# Patient Record
Sex: MALE | Race: WHITE
[De-identification: names, ages, dates, MRNs, and addresses within clinical notes are randomized per-mention and may not be internally consistent; named-entity substitution may affect disease eponyms.]

---

## 2018-01-29 ENCOUNTER — HOSPITAL ENCOUNTER (OUTPATIENT)
Dept: HOSPITAL 84 - OBSVTIME | Age: 73
LOS: 1 days | Discharge: HOME | End: 2018-01-30
Attending: INTERNAL MEDICINE
Payer: MEDICARE

## 2018-01-29 VITALS
WEIGHT: 158.13 LBS | HEIGHT: 70 IN | BODY MASS INDEX: 22.64 KG/M2 | WEIGHT: 158.13 LBS | BODY MASS INDEX: 22.64 KG/M2 | BODY MASS INDEX: 22.64 KG/M2 | HEIGHT: 70 IN

## 2018-01-29 VITALS — SYSTOLIC BLOOD PRESSURE: 136 MMHG | DIASTOLIC BLOOD PRESSURE: 79 MMHG

## 2018-01-29 VITALS — SYSTOLIC BLOOD PRESSURE: 152 MMHG | DIASTOLIC BLOOD PRESSURE: 85 MMHG

## 2018-01-29 DIAGNOSIS — I10: ICD-10-CM

## 2018-01-29 DIAGNOSIS — I25.119: Primary | ICD-10-CM

## 2018-01-29 DIAGNOSIS — T82.855A: ICD-10-CM
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
## 2018-01-29 DIAGNOSIS — E78.5: ICD-10-CM

## 2018-01-29 LAB
ANION GAP SERPL CALC-SCNC: 11.4 MMOL/L (ref 8–16)
BASOPHILS NFR BLD AUTO: 1.6 % (ref 0–2)
BUN SERPL-MCNC: 16 MG/DL (ref 7–18)
CALCIUM SERPL-MCNC: 8.8 MG/DL (ref 8.5–10.1)
CHLORIDE SERPL-SCNC: 104 MMOL/L (ref 98–107)
CO2 SERPL-SCNC: 28.8 MMOL/L (ref 21–32)
CREAT SERPL-MCNC: 1.1 MG/DL (ref 0.6–1.3)
EOSINOPHIL NFR BLD: 8.4 % (ref 0–7)
ERYTHROCYTE [DISTWIDTH] IN BLOOD BY AUTOMATED COUNT: 13.1 % (ref 11.5–14.5)
GLUCOSE SERPL-MCNC: 113 MG/DL (ref 74–106)
HCT VFR BLD CALC: 42 % (ref 42–54)
HGB BLD-MCNC: 13.8 G/DL (ref 13.5–17.5)
IMM GRANULOCYTES NFR BLD: 0.3 % (ref 0–5)
LYMPHOCYTES NFR BLD AUTO: 24.6 % (ref 15–50)
MCH RBC QN AUTO: 29 PG (ref 26–34)
MCHC RBC AUTO-ENTMCNC: 32.9 G/DL (ref 31–37)
MCV RBC: 88.2 FL (ref 80–100)
MONOCYTES NFR BLD: 12.4 % (ref 2–11)
NEUTROPHILS NFR BLD AUTO: 52.7 % (ref 40–80)
OSMOLALITY SERPL CALC.SUM OF ELEC: 280 MOSM/KG (ref 275–300)
PLATELET # BLD: 288 10X3/UL (ref 130–400)
PMV BLD AUTO: 10.1 FL (ref 7.4–10.4)
POTASSIUM SERPL-SCNC: 4.2 MMOL/L (ref 3.5–5.1)
RBC # BLD AUTO: 4.76 10X6/UL (ref 4.2–6.1)
SODIUM SERPL-SCNC: 140 MMOL/L (ref 136–145)
WBC # BLD AUTO: 7.4 10X3/UL (ref 4.8–10.8)

## 2018-01-29 PROCEDURE — 93458 L HRT ARTERY/VENTRICLE ANGIO: CPT

## 2018-01-30 VITALS — DIASTOLIC BLOOD PRESSURE: 79 MMHG | SYSTOLIC BLOOD PRESSURE: 128 MMHG

## 2019-12-27 ENCOUNTER — HOSPITAL ENCOUNTER (OUTPATIENT)
Dept: HOSPITAL 84 - D.HCCARDIO | Age: 74
Discharge: HOME | End: 2019-12-27
Attending: INTERNAL MEDICINE
Payer: MEDICARE

## 2019-12-27 VITALS — BODY MASS INDEX: 22.1 KG/M2

## 2019-12-27 DIAGNOSIS — I25.10: Primary | ICD-10-CM

## 2020-01-03 ENCOUNTER — HOSPITAL ENCOUNTER (OUTPATIENT)
Dept: HOSPITAL 84 - D.CATH | Age: 75
Discharge: HOME | End: 2020-01-03
Attending: INTERNAL MEDICINE
Payer: MEDICARE

## 2020-01-03 VITALS — BODY MASS INDEX: 22.95 KG/M2 | BODY MASS INDEX: 22.95 KG/M2 | WEIGHT: 160.34 LBS | HEIGHT: 70 IN

## 2020-01-03 VITALS — DIASTOLIC BLOOD PRESSURE: 86 MMHG | SYSTOLIC BLOOD PRESSURE: 160 MMHG

## 2020-01-03 DIAGNOSIS — I10: ICD-10-CM

## 2020-01-03 DIAGNOSIS — I25.110: Primary | ICD-10-CM

## 2020-01-03 DIAGNOSIS — E78.5: ICD-10-CM

## 2020-01-03 DIAGNOSIS — R06.09: ICD-10-CM

## 2020-01-03 LAB
ANION GAP SERPL CALC-SCNC: 11.8 MMOL/L (ref 8–16)
BASOPHILS NFR BLD AUTO: 1.8 % (ref 0–2)
BUN SERPL-MCNC: 17 MG/DL (ref 7–18)
CALCIUM SERPL-MCNC: 9 MG/DL (ref 8.5–10.1)
CHLORIDE SERPL-SCNC: 100 MMOL/L (ref 98–107)
CO2 SERPL-SCNC: 28.3 MMOL/L (ref 21–32)
CREAT SERPL-MCNC: 1.3 MG/DL (ref 0.6–1.3)
EOSINOPHIL NFR BLD: 7.6 % (ref 0–7)
ERYTHROCYTE [DISTWIDTH] IN BLOOD BY AUTOMATED COUNT: 14.2 % (ref 11.5–14.5)
GLUCOSE SERPL-MCNC: 129 MG/DL (ref 74–106)
HCT VFR BLD CALC: 38.8 % (ref 42–54)
HGB BLD-MCNC: 12.4 G/DL (ref 13.5–17.5)
IMM GRANULOCYTES NFR BLD: 0.4 % (ref 0–5)
LYMPHOCYTES NFR BLD AUTO: 24.1 % (ref 15–50)
MCH RBC QN AUTO: 26.7 PG (ref 26–34)
MCHC RBC AUTO-ENTMCNC: 32 G/DL (ref 31–37)
MCV RBC: 83.6 FL (ref 80–100)
MONOCYTES NFR BLD: 14.3 % (ref 2–11)
NEUTROPHILS NFR BLD AUTO: 51.8 % (ref 40–80)
OSMOLALITY SERPL CALC.SUM OF ELEC: 275 MOSM/KG (ref 275–300)
PLATELET # BLD: 340 10X3/UL (ref 130–400)
PMV BLD AUTO: 9.7 FL (ref 7.4–10.4)
POTASSIUM SERPL-SCNC: 4.1 MMOL/L (ref 3.5–5.1)
RBC # BLD AUTO: 4.64 10X6/UL (ref 4.2–6.1)
SODIUM SERPL-SCNC: 136 MMOL/L (ref 136–145)
WBC # BLD AUTO: 7.2 10X3/UL (ref 4.8–10.8)

## 2020-01-03 PROCEDURE — 93458 L HRT ARTERY/VENTRICLE ANGIO: CPT

## 2020-01-03 PROCEDURE — 93571 IV DOP VEL&/PRESS C FLO 1ST: CPT

## 2020-01-03 NOTE — NUR
4 CC AIR REMOVED FROM Z BAND, NO BLEEDING NOTED.  NO S/S HEMATOMA
NOTED. CAP REFILL BRISK.  PT TOLERATED LUNCH TRAY W/O NAUSEA.  DENIES
NEEDS AT THIS TIME.  VSS. CALL LIGHT IN REACH

## 2020-01-03 NOTE — NUR
PT AMBULATED TO BR, VOIDING W/O DIFFICULITY.  ZBAND AND REMAINING AIR
REMOVED W/O BLEEDING OR S/S HEMATOMA.  2X2 AND TEGADERM DRESSING
APPLIED.  IMMOBILIZER RE APPLIED TO R ARM.
1400 PT DISCHARGED TO WIFE WAITING IN PRIVATE VEHICLE.  PT HAD ALL
BELONGINGS AND DISCHARGE PAPERWORK.

## 2020-01-03 NOTE — HEMODYNAMI
PATIENT:SHERLYN BROWN                                  MEDICAL RECORD: [id number]
: 45                                            LOCATION:[facility]
ACCT# [id number]                                       ADMISSION DATE: 20
 
 
 Generatedon:1/3/62756:47
Patient name: SHERLYN BROWN Patient #: [id number] Visit #: [id number] SSN: [id number]
- :
1945 Date of study: 1/3/2020
Page: Of
Hemodynamic Procedure Report
****************************
Patient Data
Patient Demographics
Procedure consent was obtained
First Name: SHERLYN           Gender: Male
Last Name: STEPHANIE          : 1945
Middle Initial: B           Age: 75 year(s)
Patient #: [id number]       Race: 
Visit #: [id number]
SSN: 
Accession #:
[id number]
Additional ID: [id number]
Contact details
Address: [address] [place] [address]  Phone: [phone]
State: [place]
City: [place]
Zip code: [address]
Past Medical History
Performed procedures and imaging results
Date     Procedure           Procedure Results  Comments
Stress testing with Positive->High
SPECT MPI           risk
History of disease
Date         Diagnosis          Comments
CAD
Allergies
Allergen        Reaction        Date         Comments
Reported
Other allergy                   1/3/2020     SIMVASTATIN
Admission
Admission Data
Admission Date: 1/3/2020    Admission Time: 7:02
Arrival Date: 1/3/2020      Arrival Time: 0:00
[facility]
Height (in.): 70            BSA: 1.9 (m2)
Height (cm.): 177.8         BMI: 23.09 (kg/m2)
Weight (lbs.): 160.94
Weight (kg.): 73
Lab Results
Lab Result Date: 1/3/2020   Lab Result Time: 0:00
Biochemistry
Name         Units    Result                Min      Max
BUN          mg/dl    17       --(---*)--   7        18
Creatinine   mg/dl    1.3      --(---*)--   0.6      1.3
eGFR         ml/min   57       *-(----)--   90       120
NONAFRICAN
CBC
 
Name         Units    Result                Min      Max
Hematocrit   %        38.8     *-(----)--   42       54
Hemoglobin   g/dl     12.4     *-(----)--   13.5     17.5
Procedure
Procedure Types
Cath Procedure
Diagnostic Procedure
[facility] w/Coronaries
FFR/IVUS
FFR Initial
Sedation Charges
Moderate Sedation up to 30 minutes
PCI Procedure
Coronary Stent
Coronary Stent Initial
Coronary Atherectomy
Atherectomy w/Stent Coronary Initial
Hemochron ACT Test
Procedure Description
Procedure Date
Procedure Date: 1/3/2020
Procedure Start Time: 9:12
Procedure End Time: 9:45
Procedure Staff
Name                            Function
Luis E Null MD                Performing Physician
Richelle Villalobos RT               Monitor
Mary Carmen Alicia RT                Scrub
Marina Herrera RN                Nurse
Procedure Data
Cath Procedure
Fluoroscopy
Diagnostic fluoroscopy      Total fluoroscopy Time: 11
time: 11 min                min
Diagnostic fluoroscopy      Total fluoroscopy dose: 964
dose: 964 mGy               mGy
Contrast Material
Contrast Material Type                       Amount (ml)
Isovue 300                                   153
Entry Location
Entry     Primary  Successful  Side  Size  Upsize Upsize Entry    Closure     Quispe
ccessful  Closure
Location                             (Fr)  1 (Fr) 2 (Fr) Remarks  Device        
          Remarks
Radial                         Right 6 Fr                         Mechanical
artery                               Short                        Compression
Estimated blood loss: 10 ml
Diagnostic catheters
Device Type               Used For           End Catheter
Placement
DIAGNOSTIC Randolph 110cm 5  Procedure
Fr catheter (849969)
Procedure Complications
No complications
Procedure Medications
Medication           Administration Route Dosage
0.9% NaCl            I.V.                 100 ml/hr
Oxygen               etCO2 Nasal cannula  2 l/min
Lidocaine 2%         added to field       20
 
Heparin Flush Bag    added to field       2 bags
(1000units/500ml NS)
Radial Cocktail      added to field       1 syringe
(Verapamil 2mg/Nitro
400mcg/Heparin
1500units)
Versed               I.V.                 2 mg
Fentanyl             I.V.                 50 mcg
Heparin Bolus        I.V.                 4000 units
Integrilin (Bolus    I.V.                 6.8 ml
2mg/ml)
Integrilin (Bolus    wasted               3.2 ml
2mg/ml)
Plavix               P.O.                 600 mg
Fentanyl             I.V.                 50 mcg
Versed               I.V.                 2 mg
Hemodynamics
Rest
BSA: 1.9 (m2) O2 Consumption: Estimated: 258.4 (ml/min) O2 Consumption indexed: 
Estimated:136
(ml/min/m)
Pre Cath      Intra         NCS           Post Cath
Vital Signs
Time    Heart  Resp   SPO2 etCO2   NIBP (mmHg)  Rhythm  Pain    Sedation
Rate   (ipm)  (%)  (mmHg)                       Status  Level
(bpm)
8:56:44 59     15     96   29.8    Measuring    1       0 (11)  10(A)
degree  , No
AV      pain
Block
8:56:52 65     14     96   30.5    171/105(110) 1       0 (11)  10(A)
degree  , No
AV      pain
Block
9:01:51 65     13     99   33.5    Measuring    1       0 (11)  10(A)
degree  , No
AV      pain
Block
9:02:20 66     13     99   29      139/88(124)  1       0 (11)  10(A)
degree  , No
AV      pain
Block
9:06:36 62     13     97   36.5    148/82(132)  1       0 (11)  10(A)
degree  , No
AV      pain
Block
9:10:50 65     12     98   32.7    135/80(97)   1       0 (11)  10(A)
degree  , No
AV      pain
Block
9:15:00 65     11     97   31.2    120/75(93)   1       0 (11)  10(A)
degree  , No
AV      pain
Block
9:19:12 67     11     98   34.2    119/71(93)   1       0 (11)  10(A)
degree  , No
AV      pain
Block
9:23:22 74     12     98   35.7    117/71(97)   1       0 (11)  10(A)
degree  , No
 
AV      pain
Block
9:27:36 74     14     98   36.4    110/65(94)   1       0 (11)  9(A)
degree  , No
AV      pain
Block
9:31:44 76     13     96   35.7    110/71(102)  1       0 (11)  9(A)
degree  , No
AV      pain
Block
9:35:45 70     13     96   20.8    114/78(91)   1       0 (11)  9(A)
degree  , No
AV      pain
Block
9:40:44 70     14     96   30.5    149/96(139)  1       0 (11)  10(A)
degree  , No
AV      pain
Block
9:45:06 63     15     97   34.2    130/71(116)  1       0 (11)  10(A)
degree  , No
AV      pain
Block
Medications
Time    Medication       Route   Dose    Verified Delivered Reason          Note
s    Effectiveness
by       by
8:55:04 0.9% NaCl        I.V.    100     Luis E Laraa     used for
ml/hr   Tennille Herrera   procedure
RN
8:55:11 Oxygen           etCO2   2 l/min Luis E Gray     used for
Nasal           Tennille Herrera   procedure
cannula                  RN
8:55:16 Lidocaine 2%     added   20ml    Luis E Kimbrough   for local
to      vial    Tauth MD Tauth MD  anesthetic
field
8:55:21 Heparin Flush    added   2 bags  Luis E Kimbrough   used for
Bag              to              Tennille Null MD  procedure
(1000units/500ml field
NS)
8:58:40 Radial Cocktail  added   1       Luis E Kimbrough
(Verapamil       to      syringe Tennille Null MD
2mg/Nitro        field
400mcg/Heparin
1500units)
9:10:40 Versed           I.V.    2 mg    Luis E  Marina     for sedation
Tennille Herrera RN
9:10:45 Fentanyl         I.V.    50 mcg  Luis E  Marina     for sedation
Tennille Herrrea
RN
9:19:21 Heparin Bolus    I.V.    4000    Luis E  Marina     for             veri
fied
units   Tennille Herrera   anticoagulation with Dr. MIGUEL Null
9:19:37 Integrilin       I.V.    6.8 ml  Luis E  Marina     for
(Bolus 2mg/ml)                   Tennille Herrera   antiplatelet
RN        therapy
9:19:53 Integrilin       wasted  3.2 ml  Lui sE  Marina     for
(Bolus 2mg/ml)                   Tennille Herrera   antiplatelet
RN        therapy
 
9:20:05 Plavix           P.O.    600 mg  Luis E  Marina     for
Tennille Herrera   antiplatelet
RN        therapy
9:20:17 Fentanyl         I.V.    50 mcg  Luis E  Marina     for sedation
Tennille Herrera
RN
9:20:37 Versed           I.V.    2 mg    Luis E  Marina     for sedation
Tennille Herrera
RN
Procedure Log
Time     Note
18:40:47 SOME TIMES ARE OFF DUE TO A GLITCH IN MCKESSON
18:40:47 [place] band inflated with 8cc of air.
18:40:47 Fluoroscopy time 11.00 minutes.
18:40:47 Physical assessment completed. ASA score P 2 - A
patient with mild systemic disease as per Luis E Null MD.
18:40:47 PRESSURE WIRE REMOVED
8:24:34  Informed consent obtained and on chart
8:24:59  Procedure Status Elective Heart Cath (OP).
8:25:01  Time tracking: Regular hours (M-F 7:00 - 5:00)
8:25:05  Plan of Care:Hemodynamics will remain stable., Cardiac
rhythm will remain stable., Comfort level will be
maintained., Respiratory function will remain
adequate., Patient/ family verbilizes understanding of
procedure., Procedure tolerated without complication.,
Recovers from procedure without complications..
8:27:39  H&P Date Dictated: 2019 Within 30 days and on
chart., H&P Addendum completed by physician on day of
procedure. (MUST COMPLETE FOR ALL OUTPATIENTS).
8:28:08  Patient allergic to Other allergySIMVASTATIN
8:28:48  Lab Result : BUN 17 mg/dl
8::48  Lab Result : Creatinine 1.3 mg/dl
8::48  Lab Result : Hemoglobin 12.4 g/dl
8::48  Lab Result : eGFR NONAFRICAN 57 ml/min
8:28:48  Lab Result : Hematocrit 38.8 %
8:29:02  Patient Weight : 160.94 lbs
8:29:08  Patient Height : 70 inches
8:29:17  Arrival Date: 1/3/2020 12:00:00 AM
8:40:25  Marina Herrera RN sent for patient. Start room use.
8:42:48  Risk of Mortality: .1.
8:42:52  Risk of blood transfusion: 1
8:42:55  Risk of TEZ: 1.5
8:43:09  Stress Test: yes; abnormal MULTIVESSEL
8:50:35  Patient received from Pre/Post Procedure Room to CCL 1
Alert and oriented. Tansferred to table in Supine
position.
8:50:36  Warm blankets applied, and kalpana hugger turned on for
patient comfort.
8:50:36  Correct patient and procedure confirmed by team.
8:50:36  ECG and BP/O2 sat monitors applied to patient.
8:54:56  Vital chart was started
8:55:04  0.9% NaCl 100 ml/hr I.V. was administered by Marina Herrera RN; used for procedure; Verbal order read back
and verified.
8:55:11  Oxygen 2 l/min etCO2 Nasal cannula was administered by
Marina Herrera RN; used for procedure; Verbal order
read back and verified.
8:55:16  Lidocaine 2% 20ml vial added to field was administered
by uLis E Null MD; for local anesthetic; Verbal
 
order read back and verified.
8:55:21  Heparin Flush Bag (1000units/500ml NS) 2 bags added to
field was administered by Luis E Null MD; used for
procedure; Verbal order read back and verified.
8:58:40  Radial Cocktail (Verapamil 2mg/Nitro 400mcg/Heparin
1500units) 1 syringe added to field was administered
by Luis E Null MD; ; Verbal order read back and
verified.
8:59:20  Rhythm: sinus rhythm
8:59:25  Full Disclosure recording started
8:59:26  Pre-procedure instructions explained to patient.
8:59:26  Pre-op teaching completed and patient verbalized
understanding.
8:59:28  Family in patients room.
8:59:29  Patient NPO since Midnight.
8:59:31  Is the patient allergic to Iodine/contrast media? No.
8:59:32  Is patient on blood thinner?No
8:59:33  Patient diabetic? Yes.
8:59:34  If diabetic: On Metformin? Yes
8:59:38  If on Metformin: Last Dose? 2020
8:59:44  Previous problem with sedation/anesthesia? No ?
8:59:44  Snore? Yes
8:59:45  Sleep apnea? No
8:59:46  Deviated septum? No
8:59:47  Opens mouth fully? Yes
8:59:48  Sticks out tongue? Yes
8:59:51  Dentures? No ?
8:59:54  Airway obstruction? No ?
8:59:57  Pre procedure: right dorsailis pedis pulse 1+
Palpable, but thready & weak; easily obliterated
8:59:59  Modified Bolivar's test Ulnar < 7 seconds
9:00:01  Patient pain scale 0/10 ?.
9:00:05  IV patent on arrival in left forearm with 0.9% NaCl at
KVO.
9:00:07  Lab results completed and on chart.
9:00:12  Right Radial & Right Groin area was prepped with
chlora-prep and draped in sterile fashion
9:00:14  Alarms reviewed by R. N.
9:00:14  Sharps counted by scrub and verified by R.N.
9:00:17  Use device set Radial Dx or PCI
9:00:19  ACIST Syringe (00378) opened to sterile field.
9:00:20  Bag Decanter (2002S) opened to sterile field.
9:00:20  ACIST Hand Control (96249) opened to sterile field.
9:00:20  ACIST Manifold (87772) opened to sterile field.
9:00:21  Tegaderm 4 x 4 (1626W) opened to sterile field.
9:00:22  Medline Cath Pack ([id number]) opened to sterile field.
9:00:23  Parallocity Wrist Support ([id number]) opened to sterile
field.
9:00:24  EMERALD Guide Wire (907-798) opened to sterile field.
9:00:24  SHEATH 6FR RAIN (3778919) opened to sterile field.
9:07:54  --------ALL STOP TIME OUT------
9:07:54  Final Timeout: patient, procedure, and site verified
with staff and physician. All members of the team are
in agreement.
9:07:56  Right Radial & Right Groin site verified by team.
9:07:59  Fire Safety Assessment: A--An alcohol-based skin
anteseptic being used preoperatively., C--Open oxygen
or nitrous oxide is being used., D--An ESU, laser, or
fiber-optic light is being used.
9:08:20  3a) 45-59 Moderately reduced kidney function.
 
9:08:24  Maximum allowable contrast dose (3.7 X eGFR X 0.75)128
ml.
9:08:30  Sedation plan: IV Moderate Sedation Medication:Versed,
Fentanyl
9:10:40  Versed 2 mg I.V. was administered by Marina Herrera RN;
for sedation; Verbal order read back and verified.
9:10:45  Fentanyl 50 mcg I.V. was administered by Marina Herrera
RN; for sedation; Verbal order read back and verified.
9:11:30  Procedure started.
9:12:01  Local anesthetic to right radial artery with Lidocaine
2% by Luis E Null MD.**INITIAL ACCESS ONLY**
9:12:42  A 6 Fr Short sheath was inserted into the Right Radial
artery
9:12:59  A DIAGNOSTIC Tiger 110cm 5 Fr catheter (598327) was
advanced over the wire and used for Procedure.
9:14:11  LV gram done using MEJIA
9:14:13  Injector settings: Ml/sec: 5, Volume: 15,
9:14:20  EF : 50 %
9:15:21  LCA angiography performed.
9:15:57  RCA angiography performed.
9:15:58  Catheter exchanged over wire.
9:16:32  Proceeding to intervention.
9:16:36  GUIDE 6FR XBLAD 3.5 catheter ([id number]) opened to
sterile field.
9:16:36  INFLATOR Merit [place] (WK7123) opened to sterile
field.
9:16:54  CHOICE PT Extra Support 182cm wire ([id number]) opened
to sterile field.
9:17:08  6 Fr XBLAD 3.5 guide catheter was inserted over the
wire
9:17:32  Volcano Verrata Plus pressure wire (83806Y) opened to
sterile field.
9:19:21  Heparin Bolus 4000 units I.V. was administered by
Marina Herrera RN; for anticoagulation; verified with
Dr. Null Verbal order read back and verified.
9:19:37  Integrilin (Bolus 2mg/ml) 6.8 ml I.V. was administered
by Marina Herrera RN; for antiplatelet therapy; Verbal
order read back and verified.
9:19:53  Integrilin (Bolus 2mg/ml) 3.2 ml wasted was
administered by Marina Herrera RN; for antiplatelet
therapy; Verbal order read back and verified.
9:20:05  Plavix 600 mg P.O. was administered by Marina Herrera
RN; for antiplatelet therapy; Verbal order read back
and verified.
9:20:17  Fentanyl 50 mcg I.V. was administered by Marina Herrera
RN; for sedation; Verbal order read back and verified.
9:20:18  FFR/IFR wire advanced.
9:20:37  Versed 2 mg I.V. was administered by Marina Herrera RN;
for sedation; Verbal order read back and verified.
9:22:19  Wire advanced across lesion.
9:22:42  Ramus lesion measured at .84 with IFR
9:23:00  CHOICE  wire advanced.
9:23:26  Pre PCI Site: Native Ramus has 80% stenosis.
9:26:54  Pre PCI Site: Native Diag1 has 90% stenosis.
9:26:56  Wire advanced across DIAG lesion.
9:27:43  Inflate balloon Inflation number: 1 A EUPHORA 2.0 x 10
Balloon ([id number]) was prepped and advanced across the
1st Diag , then inflated to 13 JASVIR for 0:00 (min:sec)
.
9:27:48  Balloon removed over the wire.
 
9:29:07  Place stent Inflation Number: 2 A IRAIS RX 2.0 x 8
stent ([id number]) was prepped and advanced across
the 1st Diag . The stent was deployed at 13 JASVIR for
0:00 (min:sec) .
9:29:33  Inflation number: 3 The stent balloon was then
re-inflated across the 1st Diag to 7 JASVIR for 0:00
(min:sec) .
9:29:45  Stent catheter was removed intact over wire.
9:29:46  Wire removed.
9:29:47  Guide catheter removed.
9:30:19  GUIDE 6FR AR 2.0 catheter ([id number]) opened to sterile
field.
9:30:24  LASER ELCA 0.9 Rx atherectomy catheter (400000) opened
to sterile field.
9:30:36  Pre PCI Site: Native mRCA has 90% stenosis.
9:32:51  6 Fr AR 2 guide catheter was inserted over the wire
9:33:19  CHOICE  wire advanced.
9:33:55  Wire advanced across lesion.
9:35:04  Laser pass to mRCA with Fluence of 80 and Rate of 40.
9:35:07  ACT drawn and resulted at 286 seconds. (normal
therapeutic range 180-240 seconds).
9:37:03  Laser catheter removed.
9:37:04  Laser total pulses delivered: ?
9:37:07  Laser total treatment time: 0 minutes 0 seconds
9:38:07  ?.
9:38:30  Place stent Inflation Number: 1 A IRAIS RX 4.0 x 15
stent ([id number]) was prepped and advanced across
the Mid RCA . The stent was deployed at 17 JASVIR for
0:00 (min:sec) .
9:38:39  Inflation number: 2 The stent balloon was then
re-inflated across the Mid RCA to 17 JASVIR for 0:00
(min:sec) .
9:39:25  Stent catheter was removed intact over wire.
9:40:29  Inflate balloon Inflation number: 3 A NC EUPHORA 4.0 x
12 balloon ([id number]) was prepped and advanced
across the Mid RCA , then inflated to 23 JASVIR for 0:00
(min:sec) .
9:40:54  Balloon removed over the wire.
9:40:55  Wire removed.
9:40:55  Guide catheter removed.
9:41:14  ZEPHYR REGULAR TR BAND (996064) opened to sterile
field.
9:41:29  Procedure ended.(Physican Out)
9:41:53  Sheath removed intact; hemostasis achieved with
Mechanical Compression to the Right Radial artery.
9:42:07  Flurop Dose total: 964
9:42:07  Fluoroscopy dose: 964 mGy
9:42:28  Dose Area Product 91851 mGy/cm.
9:42:31  Contrast amount:Isovue 300 153ml.
9:42:34  Maximum allowable dose exceeded? Yes.
9:42:35  Sharps counted by scrub and verified by R.N.
9:42:40  Post-procedure physical assessment completed. ASA
score P 2 - A patient with mild systemic disease as
per Luis E Null MD.
9:42:50  Post procedure rhythm: unchanged.
9:42:57  Estimated blood loss: 10 ml
9:42:59  Patient needs reinforcement of post procedure
teaching.
9:42:59  Post procedure instruction explained to
patient.Patient verbalizes understanding.
 
9:43:54  Procedure type changed to Cath procedure, Diagnostic
procedure, LHC, LHC w/Coronaries, FFR/IVUS, FFR
Initial, Sedation Charges, Moderate Sedation up to 30
minutes, PCI procedure, Coronary Stent, Coronary Stent
Initial, Coronary Atherectomy, Atherectomy w/Stent
Coronary Initial, Hemochron ACT Test
9:45:11  Procedure and supply charges have been captured,
reviewed, submitted and are correct.
9:45:13  Procedure Complication : No complications
9:45:15  Vital chart was stopped
9:45:16  [facility] Findings: MVD- PCI performed (see procedure note)
9:45:17  Operative report dictated upon procedure completion.
9:45:18  See physician's report for complete and final results.
9:45:20  Report given to Pre/Post Procedure Room.
9:45:23  Patient transfered to Pre/Post Procedure Room with
Bed.
9:45:25  Procedure ended.
9:45:25  Full Disclosure recording stopped
9:45:32  **ACC-PCI Only** Patient was given prescriptions, or
instructed by Luis E Null MD to start/continue the
following medications upon discharge: Plavix
9:46:08  End room use (Document Last)
Intervention Summary
Intervention Notes
Time    ActionType  Lesion and  Equipment Used Action#  Pressure  Duration
Attributes
9:27:43 Inflate     1st Diag    EUPHORA 2.0 x  1        13        00:00
balloon                 10 Balloon
([id number])
9:29:07 Place stent 1st Diag    IRAIS RX 2.0 x  2        13        00:00
8 stent
([id number])
9:29:33 Reinflate   1st Diag    IRAIS RX 2.0 x  3        7         00:00
stent                   8 stent
balloon                 ([id number])
9:38:30 Place stent Mid RCA     IRAIS RX 4.0 x  1        17        00:00
15 stent
([id number])
9:38:39 Reinflate   Mid RCA     IRAIS RX 4.0 x  2        17        00:00
stent                   15 stent
balloon                 ([id number])
9:40:29 Inflate     Mid RCA     NC EUPHORA 4.0 3        23        00:00
balloon                 x 12 balloon
([id number])
Device Usage
Item Name      Manufacture  Quantity  Catalog Number Hospital Part       Current
 Minimal Lot# /
Charge   Number     Stock   Stock   Serial#
Code
ACIST Syringe  Acist        1         88075          178220   402467     941441 
 20
(43738)        Medical
Systems Inc
Bag Decanter   Microtek     1         S          955680   54173      392529 
 5
(S)        Medical Inc.
ACIST Hand     Acist        1         12888          966552   549197     012409 
 5
Control        Medical
(87019)        Systems Inc
 
ACIST Manifold Acist        1         74108          238286   140005     431983 
 5
(51468)        Medical
Systems Inc
Tegaderm 4 x 4 3M           1         1626W          244009   254193     969321 
 5
(1626W)
Medline Cath   Medline      1         [id number]      661554   31199      017384 
 5
Pack
([id number])
MBrace Wrist   Advanced     1         [phone]    333258   54673      799095 
 5
Support        Vascular
(698909536)    Dynamics
EMERALD Guide  Cardinal     1         502-455        007026   936176     502707 
 5
Wire (502-455) Health
SHEATH 6FR     Cardinal     1         6164371        499989   9971259    837339 
 5
RAIN (6493843) Health
DIAGNOSTIC     Terumo       1                 479539   636606     706894 
 5
Tiger 110cm 5
Fr catheter
(291761)
GUIDE 6FR      Cardinal     1         [id number]       032551   828347     951295 
 10
XBLAD 3.5      Health
catheter
(24320656)
INFLATOR Merit Merit        1         QG4948         933770   746685     730984 
 15
[employer]
(YX0559)
CHOICE PT      [place]       1         [id number]  578103   238268     709159 
 5
Extra Support  Scientific
182cm wire
([id number])
Volcano        Volcano      1         91682A         529566   472091129  890911 
 5
Verrata Plus
pressure wire
(99007M)
EUPHORA 2.0 x  Medtronic    1         [id number]       862943   250829     687806 
 5       [id number]
10 Balloon
([id number])
IRAIS RX 2.0 x  Medtronic    1         [id number]   490219   2893966    625908 
 5       [id number]
8 stent
([id number])
GUIDE 6FR AR   Medtronic    1         GC7XD40        780294   13629      422480 
 1
2.0 catheter
([id number])
LASER ELCA 0.9 Vikki      1         110-004        796326   763409     810535 
 5
Rx atherectomy [facility]
 
catheter       (458463)
(473593)
IRAIS RX 4.0 x  Medtronic    1         [id number]   866583   9979434    375560 
 5       [id number]
15 stent
([id number])
NC EUPHORA 4.0 Medtronic    1         [id number]     459194   316444     155324 
 1       [id number]
x 12 balloon
([id number])
ZEPHYR REGULAR Cardinal     1         253623         321136   4897638    336896 
 5
TR [facility]        [employer]
(322713)
Signature Audit Mad[place]
Stage           Time        Signature      Unsigned
Intra-Procedure 1/3/2020    Richelle Villalobos
9:47:14 AM  RT(R)
Intra-Procedure 1/3/2020    Marina Herrera
9:47:40 AM  RN
Intra-Procedure 1/3/2020    Luis E Null
9:47:55 AM  MD
 
 
 
 
 
 
 
 
 
 
 
 
 
 
 
 
 
 
 
 
 
 
 
 
 
 
 
 
 
 
 
 
 
[address] [place] [address]

## 2020-01-03 NOTE — NUR
PT DOING WELL, O2 REMOVED, SAT ON ROOM AIR 95.  Z BAND AND
IMMOBILIZER IN PLACE, DRSG CLD NO S/S HEMATOMA NOTED.  CALL LIGHT IN
REACH

## 2020-01-03 NOTE — NUR
PT RECEIVED VIA STRETCHER FROM CATH LAB FOR RECOVERY.  PT AWAKE BUT
DROWSY, DENIES PAIN OR DISCOMFORT.  IV PATENT INFUSING VIA ORDERS TO
L ARM.  PT PLACED ON CARDIAC MONITORS.  O2 SAT ON ROOM AIR 92, PLACED
O2 ON VIA NC AT 2L.  HR NSR RATE 62, /71, RR 12.  R WRIST W
ZYPHER BAND AND IMMOBILIZER.  DRESSING CDI NO BLEEDING OR S/S
HEMATOMA NOTED.  ARM PINK AND WARM, CAP REFILL BRISK. CALL LIGHT IN
REACH.

## 2020-01-03 NOTE — NUR
R WRIST W/O S/S HEMATOMA NOTED.  CAP REFILL BRISK.  VSS.  PT MORE
AWAKE, DENIES PAIN OR NEEDS AT THIS TIME.  CALL LIGHT IN REACH,
FAMILY REMAIN AT BS.

## 2020-01-03 NOTE — NUR
PT DOING WELL, DR PHELPS WAS IN AND SPOKE WITH PT AND WIFE REGARDING
PLAN OF CARE AND PROCEDURE RESULTS.  ZBAND IN PLACE, DRESSING REMAINS
CDI NO BLEEDING OR S/S HEMATOMA NOTED.  CAP REFILL REMAINS BRISK.
HOB ELEVATED AND SANDWICH AND DRINK SERVED.  VSS.  CALL LIGHT IN
REACH

## 2020-01-03 NOTE — NUR
PT RESTING, DENIES PAIN OR DISCOMFORT.  ZBAND AND IMMOBILIZER IN
PLACE, DRESSING CDI NO S/S HEMATOMA NOTED.  CAP REFILL BRISK.  HR 57,
/67, RR 14, SAT 96.  FAMILY AT BEDSIDE, CALL LIGHT IN REACH

## 2020-01-03 NOTE — NUR
2 MORE CC AIR REMOVED FROM Z BAND, NO BLEEDING OR S/S HEMATOMA NOTED.
 CAP REFILL BRISK.  VSS.  DISCHARGE INSTRUCTIONS REVIEWED W CATH
INTACT, MONITORS REMOVED AND PT UP TO DRESS FOR DISCHARGE.

## 2020-01-06 NOTE — OP
PATIENT NAME:  SHERLYN BROWN                           MEDICAL RECORD: [id number]
:45                                             LOCATION:D.CAT          
                                                         ADMISSION DATE:        
SURGEON:  JAMILA PHELPS MD             
 
 
DATE OF OPERATION:  2020
 
PROCEDURES:
1.  Laser atherectomy with PTCA and stent of RCA.
2.  PTCA and stent of LAD diagonal.
3.  IFR.
4.  Left heart catheterization.
5.  Selective coronary angiography.
6.  Left ventriculogram.
 
INDICATIONS:  Unstable angina and coronary artery disease.
 
PROCEDURE IN DETAIL:  After informed consent was obtained and after a detailed
explanation of the risks, benefits as well as alternative therapy, the patient
elected to proceed with angiogram and angioplasty.  The right radial area was
prepped and draped in normal sterile fashion.  Right radial artery was
cannulated via modified Seldinger technique with placement of 6-Kyr[place]z sheath. 
All catheters exchanged through this sheath.
 
FINDINGS:  Left ventriculogram was performed in a standard 30-degree MEJIA view
reveals preserved ejection fraction at 50%.
 
SELECTIVE CORONARY ANGIOGRAPHY:
1.  Left main is with no significant angiographic disease.
2.  Left anterior descending has previously placed stents, these are widely
patent; however, the diagonal was 95% stenosed.
3.  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
rerouted.  IFR was abnormal; however, we could never cannulate what we felt was
the true lumen, hence no intervention will be undertaken at this vessel. 
Otherwise, the circumflex has previously placed stents that are widely patent.
4.  The right coronary has previously placed stents with 90% in-stent restenosis
in the proximal vessel.
 
PTCA AND STENT OF THE LAD DIAGONAL:  The stent used was a 2.0 x 8-mm [place]. 
Result was 0% residual stenosis.
 
PTCA AND STENT, LASER ATHERECTOMY OF THE RCA:  Laser atherectomy was performed. 
Multiple passes were made with a 0.9 laser catheter at 80/40.  We then stented
this with a 4.0 x 15 [place] stent.  Then, we used a 4.0 high pressure balloon to
23 atmospheres.  We could not fully expand the stent.  There was still a 30%
residual stenosis.
 
OVERALL IMPRESSION:  Successful PTCA and stent, laser atherectomy of the RCA and
successful PTCA and stent of the LAD diagonal as described above.
 
TRANSINT:[id number] Voice Confirmation ID: [id number] DOCUMENT ID: [id number]
 
 
 
OPERATIVE REPORT                               [id number]    SHERLYN BROWN JEFFREY MD             
 
 
 
Electronically Signed by JAMILA PHELPS on 20 at 1113
 
 
 
 
 
 
 
 
 
 
 
 
 
 
 
 
 
 
 
 
 
 
 
 
 
 
 
 
 
 
 
 
 
 
 
 
 
 
 
 
 
CC:                                                             [id number]
DICTATION DATE: 20 0947     :     20 1257      DEP CLI 
                                                                      20
[id number] [place] [address]